# Patient Record
Sex: FEMALE | Race: WHITE | NOT HISPANIC OR LATINO | URBAN - NONMETROPOLITAN AREA
[De-identification: names, ages, dates, MRNs, and addresses within clinical notes are randomized per-mention and may not be internally consistent; named-entity substitution may affect disease eponyms.]

---

## 2018-09-13 NOTE — PROCEDURE NOTE: CLINICAL
PROCEDURE NOTE: Punctal Plugs, Quintess Dissolvable (69677P, ) 0.4mm Quintess R eye 0.5mm Quintess L eye.

## 2019-09-19 NOTE — PROCEDURE NOTE: CLINICAL
PROCEDURE NOTE: Punctal Plugs, Silicone #2 Bilateral Lower Lids. Diagnosis: Dry Eye Syndrome. Anesthesia: Topical. Prep: Antibiotic Drops q 5min x 3. Prior to treatment, the risks/benefits/alternatives were discussed. The patient wished to proceed with procedure. Permanent silicone plugs were inserted. Patient tolerated procedure well. There were no complications. Post procedure instructions given. Size 0.4mm Eagle.

## 2019-10-18 ENCOUNTER — IMPORTED ENCOUNTER (OUTPATIENT)
Dept: URBAN - NONMETROPOLITAN AREA CLINIC 1 | Facility: CLINIC | Age: 84
End: 2019-10-18

## 2019-10-18 PROBLEM — Z96.1: Noted: 2019-10-18

## 2019-10-18 PROBLEM — H35.3131: Noted: 2019-10-18

## 2019-10-18 PROBLEM — H31.019: Noted: 2019-10-18

## 2019-10-18 PROBLEM — S02.31XA: Noted: 2019-10-18

## 2019-10-18 PROBLEM — H16.223: Noted: 2019-10-18

## 2019-10-18 PROCEDURE — 99212 OFFICE O/P EST SF 10 MIN: CPT

## 2019-10-18 NOTE — PATIENT DISCUSSION
ORbital Fracture Right Side-No double visionARMD OU. Mac Scar OD. Appears stable. Discussed use of Amsler Grid OU. Order OCT Mac at next visit. Dry Eyes OU. Continue Syustane OU PRN. Pt using mostly QAM.Recommend increase tears OU PRN with increased frequency of dry eye symptoms. 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 Well Healed. s/p PCIOL-Stable PCIOL s/p YAG Caps OU.-Monitor. RTC True DICK OCT MAC

## 2020-03-06 ENCOUNTER — IMPORTED ENCOUNTER (OUTPATIENT)
Dept: URBAN - NONMETROPOLITAN AREA CLINIC 1 | Facility: CLINIC | Age: 85
End: 2020-03-06

## 2020-03-06 PROBLEM — H35.3131: Noted: 2020-03-06

## 2020-03-06 PROBLEM — Z96.1: Noted: 2019-10-18

## 2020-03-06 PROBLEM — H16.223: Noted: 2019-10-18

## 2020-03-06 PROBLEM — S02.31XA: Noted: 2020-03-06

## 2020-03-06 PROBLEM — H31.019: Noted: 2019-10-18

## 2020-03-06 PROCEDURE — 92014 COMPRE OPH EXAM EST PT 1/>: CPT

## 2020-03-06 PROCEDURE — 92134 CPTRZ OPH DX IMG PST SGM RTA: CPT

## 2020-03-06 NOTE — PATIENT DISCUSSION
ARMD OU. Mac Scar OD. Appears stable. Discussed use of Amsler Grid OU. OCT MAC done today and reviewed w/ patient Stable OU from previous scans  Elevated scar OD Order OCT MAC for next visit Dry Eyes OU. Continue Syustane OU PRN. Pt using mostly QAM.Recommend increase tears OU PRN with increased frequency of dry eye symptoms. 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 Well Healed. ORbital Fracture Right Side-No double visions/p PCIOL-Stable PCIOL s/p YAG Caps OU.-Monitor. RTC 1 year CEE w/ OCT MAC

## 2020-05-20 NOTE — PROCEDURE NOTE: CLINICAL
PROCEDURE NOTE: Punctal Plugs, Silicone #1 Left Lower Lid. Anesthesia: Topical. Prep: Antibiotic Drops q 5min x 3. Prior to treatment, the risks/benefits/alternatives were discussed. The patient wished to proceed with procedure. One drop of proparacaine was placed and a drop of lidocaine gel was placed over the puncta. 0.5 mm permanent silicone plugs were inserted in * eyelids. Patient tolerated procedure well. There were no complications. Post procedure instructions given. Debbie Paz PROCEDURE NOTE: Punctal Plugs, Silicone #1 Right Lower Lid. Anesthesia: Topical. Prep: Antibiotic Drops q 5min x 3. Prior to treatment, the risks/benefits/alternatives were discussed. The patient wished to proceed with procedure. One drop of proparacaine was placed and a drop of lidocaine gel was placed over the puncta. 0.4 mm permanent silicone plugs were inserted in * eyelids. Patient tolerated procedure well. There were no complications. Post procedure instructions given. Debbie Paz

## 2020-09-15 NOTE — PATIENT DISCUSSION
Recommend removal due to size increase and irritation. Patient elects removal today, consent signed.

## 2020-09-15 NOTE — PATIENT DISCUSSION
Recommend removal due to size increase and irritation. Patient elects removal today, photo taken, consent signed.

## 2020-11-10 NOTE — PROCEDURE NOTE: CLINICAL
PROCEDURE NOTE: Excision of Eyelid Lesion Right Lower Lid. Diagnosis: Other Benign Neoplasm of Skin of Eyelid, Including Canthus. Anesthesia: Topical. Prep: Betadine Scrub. Risks, benefits and alternatives discussed. Patient desires to proceed with excision of growth/lesion today. A drop of proparacaine was instilled in the involved eye. A tetracaine drop was used on a cotton tipped applicator and placed on the skin. The lesion was excised using a 27g needle and contents discarded. Erythromycin ophthalmic ointment was applied. Post op instructions were given to the patient. The patient tolerated the procedure well and left in good condition. The patient understands to call us for any concerns. Ayo Che

## 2021-05-12 NOTE — PROCEDURE NOTE: CLINICAL
PROCEDURE NOTE: Punctal Plugs, Silicone #1 Left Lower Lid. Anesthesia: Topical. Prep: Antibiotic Drops q 5min x 3. Prior to treatment, the risks/benefits/alternatives were discussed. The patient wished to proceed with procedure. One drop of proparacaine was placed and a drop of lidocaine gel was placed over the puncta. 0.4 mm permanent silicone plugs were inserted in * eyelids. Patient tolerated procedure well. There were no complications. Post procedure instructions given. John Navas

## 2022-02-18 ENCOUNTER — ESTABLISHED PATIENT (OUTPATIENT)
Dept: URBAN - NONMETROPOLITAN AREA CLINIC 4 | Facility: CLINIC | Age: 87
End: 2022-02-18

## 2022-02-18 DIAGNOSIS — H35.3131: ICD-10-CM

## 2022-02-18 DIAGNOSIS — H52.03: ICD-10-CM

## 2022-02-18 PROCEDURE — 92015 DETERMINE REFRACTIVE STATE: CPT

## 2022-02-18 PROCEDURE — 92134 CPTRZ OPH DX IMG PST SGM RTA: CPT

## 2022-02-18 PROCEDURE — 92014 COMPRE OPH EXAM EST PT 1/>: CPT

## 2022-02-18 ASSESSMENT — VISUAL ACUITY
OS_CC: 20/25
OD_CC: 20/30-1
OU_CC: J1

## 2022-02-18 ASSESSMENT — TONOMETRY
OD_IOP_MMHG: 14
OS_IOP_MMHG: 13

## 2022-04-09 ASSESSMENT — TONOMETRY
OD_IOP_MMHG: 15
OD_IOP_MMHG: 15
OS_IOP_MMHG: 15
OS_IOP_MMHG: 15

## 2022-04-09 ASSESSMENT — VISUAL ACUITY
OS_SC: 20/30
OD_SC: 20/40-1
OD_SC: 20/30+2
OS_SC: 20/20-2
OD_PH: 20/40
OS_PH: 20/25

## 2022-05-07 NOTE — PATIENT DISCUSSION
Amsler grid at home. MVI/AREDS discussed. Patient to call if any changes in vision or grid card.
Blepharoplasty Recommended.
Discussed AREDS 2 supplements, UV protection and green leafy vegetables.
Discussed importance of yearly eye exams and provided patient with literature.
Glasses Prescription given to patient.
Monitor.
Patient is doing well following YAG capsulotomy. Signs and symptoms of retinal detachment including flashing and floaters were discussed. Patient was asked to schedule yearly preventative follow-up eye exams with us.
Patient made aware of 24/7 emergency services.
Pt would like to have removed with KK.
Recommend removal due to size increase and irritation. Patient elects removal today, consent signed.
Recommend removal due to size increase and irritation. Patient elects removal today, photo taken, consent signed.
Recommended removal with 27 gauge needle and discard. Pt elects removal today, consent signed.
See #1.
moxifloxacin TID OU for 7 days.
normal

## 2024-07-09 NOTE — PATIENT DISCUSSION
Amsler grid at home. MVI/AREDS discussed. Patient to call if any changes in vision or grid card.
Blepharoplasty Recommended.
Continue current management.
Discussed AREDS 2 supplements, UV protection and green leafy vegetables.
Discussed importance of compliance with ocular medications and follow up exams to prevent loss of vision.
Discussed importance of yearly eye exams and provided patient with literature.
Educated patient about signs and symptoms of ocular involvement and asked patient to call immediately for any increasing eye pain, redness or photosensitivity.
Glasses Prescription given to patient.
Monitor.
PLUGS IN PLACE.
Patient made aware of 24/7 emergency services.
Patient understands condition, prognosis and need for follow up care.
RNFL OU APPEARS HEALTHY.
Rec eval with PCP as well.
Recommend removal due to size increase and irritation. Patient elects removal today, consent signed.
Recommend removal due to size increase and irritation. Patient elects removal today, photo taken, consent signed.
Recommended artificial tears to use as directed.
Recommended removal with 27 gauge needle and discard. Pt elects removal today, consent signed.
Refer for Blepharoplasty Evaluation.
Resolving today. Warm compresses.
See #1.
Signs of ocular involvement, small unger sign today and disc must follow carefully next week.
Stable.
TRY TYRVAYA BID OU.
The IOP is in the target range.
start on Acyclovir 800mg 5x a day PO.
24

## 2024-12-27 ENCOUNTER — COMPREHENSIVE EXAM (OUTPATIENT)
Age: 89
End: 2024-12-27

## 2024-12-27 DIAGNOSIS — H52.4: ICD-10-CM

## 2024-12-27 DIAGNOSIS — Z96.1: ICD-10-CM

## 2024-12-27 DIAGNOSIS — H52.223: ICD-10-CM

## 2024-12-27 DIAGNOSIS — H52.03: ICD-10-CM

## 2024-12-27 DIAGNOSIS — H31.011: ICD-10-CM

## 2024-12-27 DIAGNOSIS — H35.3131: ICD-10-CM

## 2024-12-27 DIAGNOSIS — Z98.890: ICD-10-CM

## 2024-12-27 DIAGNOSIS — H16.223: ICD-10-CM

## 2024-12-27 PROCEDURE — 99214 OFFICE O/P EST MOD 30 MIN: CPT

## 2024-12-27 PROCEDURE — 92015 DETERMINE REFRACTIVE STATE: CPT

## 2024-12-27 PROCEDURE — 92134 CPTRZ OPH DX IMG PST SGM RTA: CPT
